# Patient Record
Sex: FEMALE | Race: WHITE | NOT HISPANIC OR LATINO
[De-identification: names, ages, dates, MRNs, and addresses within clinical notes are randomized per-mention and may not be internally consistent; named-entity substitution may affect disease eponyms.]

---

## 2017-03-03 ENCOUNTER — APPOINTMENT (OUTPATIENT)
Dept: OTOLARYNGOLOGY | Facility: CLINIC | Age: 52
End: 2017-03-03

## 2017-03-03 VITALS
HEART RATE: 73 BPM | SYSTOLIC BLOOD PRESSURE: 121 MMHG | DIASTOLIC BLOOD PRESSURE: 78 MMHG | WEIGHT: 140 LBS | HEIGHT: 62 IN | BODY MASS INDEX: 25.76 KG/M2

## 2017-03-03 DIAGNOSIS — Z82.49 FAMILY HISTORY OF ISCHEMIC HEART DISEASE AND OTHER DISEASES OF THE CIRCULATORY SYSTEM: ICD-10-CM

## 2017-03-03 DIAGNOSIS — Z83.3 FAMILY HISTORY OF DIABETES MELLITUS: ICD-10-CM

## 2017-03-04 ENCOUNTER — TRANSCRIPTION ENCOUNTER (OUTPATIENT)
Age: 52
End: 2017-03-04

## 2017-03-15 ENCOUNTER — APPOINTMENT (OUTPATIENT)
Dept: OTOLARYNGOLOGY | Facility: CLINIC | Age: 52
End: 2017-03-15

## 2017-04-07 ENCOUNTER — OTHER (OUTPATIENT)
Age: 52
End: 2017-04-07

## 2017-04-14 ENCOUNTER — APPOINTMENT (OUTPATIENT)
Dept: OTOLARYNGOLOGY | Facility: CLINIC | Age: 52
End: 2017-04-14

## 2017-04-26 ENCOUNTER — TRANSCRIPTION ENCOUNTER (OUTPATIENT)
Age: 52
End: 2017-04-26

## 2017-05-06 ENCOUNTER — TRANSCRIPTION ENCOUNTER (OUTPATIENT)
Age: 52
End: 2017-05-06

## 2019-04-19 ENCOUNTER — APPOINTMENT (OUTPATIENT)
Dept: OTOLARYNGOLOGY | Facility: CLINIC | Age: 54
End: 2019-04-19

## 2020-04-29 ENCOUNTER — TRANSCRIPTION ENCOUNTER (OUTPATIENT)
Age: 55
End: 2020-04-29

## 2020-12-14 ENCOUNTER — APPOINTMENT (OUTPATIENT)
Dept: OTOLARYNGOLOGY | Facility: CLINIC | Age: 55
End: 2020-12-14
Payer: SELF-PAY

## 2020-12-14 VITALS
BODY MASS INDEX: 32.2 KG/M2 | HEIGHT: 62 IN | HEART RATE: 84 BPM | TEMPERATURE: 97.3 F | WEIGHT: 175 LBS | DIASTOLIC BLOOD PRESSURE: 97 MMHG | SYSTOLIC BLOOD PRESSURE: 140 MMHG

## 2020-12-14 PROCEDURE — 99204 OFFICE O/P NEW MOD 45 MIN: CPT | Mod: 25

## 2020-12-14 PROCEDURE — 31231 NASAL ENDOSCOPY DX: CPT

## 2020-12-14 NOTE — PHYSICAL EXAM
[de-identified] : very poor tip support  [Midline] : trachea located in midline position [Normal] : no rashes

## 2020-12-14 NOTE — PROCEDURE
[FreeTextEntry6] : Procedure performed: Nasal Endoscopy- Diagnostic\par Pre-op indication(s): nasal congestion\par Post-op indication(s): nasal congestion \par Verbal and/or written consent obtained from patient\par Anterior rhinoscopy insufficient to account for symptoms\par Scope #: 3,  flexible fiber optic telescope \par The scope was introduced in the nasal passage between the middle and inferior turbinates to exam the inferior portion of the middle meatus and the fontanelle, as well as the maxillary ostia.  Next, the scope was passed medically and posteriorly to the middle turbinates to examine the sphenoethmoid recess and the superior turbinate region.\par Upon visualization the finders are as follows:\par Nasal Septum: dry scarred anterior lining, slit like septal perf clean, right septal deviation anteriorly + narrow valve \par Bilateral - Mucosa: boggy turbinates, Mucous: scant, Polyp: not seen, Inferior Turbinate: boggy, Middle Turbinate: normal, Superior Turbinate: normal, Inferior Meatus: narrow, Middle Meatus: post surgical, sinuses open. Super Meatus:normal, Sphenoethmoidal Recess: clear\par

## 2020-12-14 NOTE — HISTORY OF PRESENT ILLNESS
[de-identified] : right sided SMG mostly gets swelling intermittently \par 3 years\par also left parotid - swells up less\par get swelling in the right parotid and left SMg as well \par uses hot pack - does help \par feels like throat is closing \par no dry eyes or dry mouth \par no history chemo or rt \par \par also with sinus issues with history of sinus surgery and a septal perf after \par gets nasal congestion worse in the right \par

## 2020-12-14 NOTE — ASSESSMENT
[FreeTextEntry1] : Nasal congestion with mild septal deviation, perf and scarred lining with valve collapse and poor tip support. airway still open, lots of scarring and perf, would avoid further procedure \par - Will start q-nasal, has helped in the past. A topical steroid reduce mucosal swelling, illustrated appropriate use and how to reduce the risk of bleeding \par - Nasal irrigation and showed how to use it to maximize effectiveness \par - ponaris oil\par continue allergy shots \par \par \par pt with right SMG stone - 2mm, likely can get via sialoendsocopy, looks to be in the proxiumal duct on scan, left parotid stone in the tail of the parotid, unlikley to be in the duct - will likely not be able to remove, and is likely not the cause of the issue\par it seems all her glands are swelling, but not texbook for obstruction, she would like to tryto clean them out and diilate to see if will help. disucssed risk of persist sx, and odd of her cure are lower. she understood \par Discussed options for recurrent submandibular sialoadenitis- observation with conservative management versus interventional sialoendoscopy vs excision of gland. \par Discussed details of the regiment/procedures and risks of each including but not limited to:\par interventional sialoendoscopy - bleeding, infection, scarring, inability to remove stone, ductal perforation/avulsion, injury to surrounding nerves, seroma, persistent sx \par Combined approach - increased risk of bleeding and infections injury to surrounding nerves including lingual, hypoglossal and sensory nerves as well as megaduct with large stone \par complete gland removal - external scar, injury to marginal mandibular nerve etc. \par Would like to do sialoendoscopy. \par Discussed options for recurrent parotid sialoadenitis- observation with conservative management versus interventional sialoendoscopy vs excision of gland. \par Discussed details of the regiment/procedures and risks of each including but not limited to:\par interventional sialoendoscopy - bleeding, infection, scarring, inability to remove stone, ductal perforation/avulsion, injury to surrounding nerves, seroma, persistent or worsening of sx.\par Combined approach - increased risk of bleeding and infections injury to surrounding nerves including facial nerved and sensory nerves. \par complete gland removal - external scar, injury to facial nerve etc. \par Would like to do sialoendoscopy.  Will  try to switch insurances and then will call to book surgery\par will pan for 4 gland washout\par no dry eyes of mouth\par

## 2022-02-07 ENCOUNTER — APPOINTMENT (OUTPATIENT)
Dept: OTOLARYNGOLOGY | Facility: CLINIC | Age: 57
End: 2022-02-07
Payer: COMMERCIAL

## 2022-02-07 VITALS — WEIGHT: 175 LBS | HEIGHT: 62 IN | BODY MASS INDEX: 32.2 KG/M2 | TEMPERATURE: 97.6 F

## 2022-02-07 PROCEDURE — 99214 OFFICE O/P EST MOD 30 MIN: CPT | Mod: 25

## 2022-02-07 PROCEDURE — 31231 NASAL ENDOSCOPY DX: CPT

## 2022-02-07 NOTE — PHYSICAL EXAM
[Midline] : trachea located in midline position [Normal] : no rashes [de-identified] : + decreased flow from all 4 glands  [de-identified] : very poor tip support

## 2022-02-07 NOTE — PROCEDURE
[FreeTextEntry6] : Procedure performed: Nasal Endoscopy- Diagnostic\par Pre-op indication(s): nasal congestion\par Post-op indication(s): nasal congestion \par Verbal and/or written consent obtained from patient\par Anterior rhinoscopy insufficient to account for symptoms\par Scope #: 3,  flexible fiber optic telescope \par The scope was introduced in the nasal passage between the middle and inferior turbinates to exam the inferior portion of the middle meatus and the fontanelle, as well as the maxillary ostia.  Next, the scope was passed medically and posteriorly to the middle turbinates to examine the sphenoethmoid recess and the superior turbinate region.\par Upon visualization the finders are as follows:\par Nasal Septum: dry scarred anterior lining, slit like septal perf clean, right septal deviation anteriorly + narrow valve, +multiple septal perf- clean \par Bilateral - Mucosa: boggy turbinates, Mucous: scant, Polyp: not seen, Inferior Turbinate: boggy, Middle Turbinate: BITH, Superior Turbinate: normal, Inferior Meatus: narrow, Middle Meatus: post surgical, sinuses open. Super Meatus:normal, Sphenoethmoidal Recess: clear\par

## 2022-02-07 NOTE — ASSESSMENT
[FreeTextEntry1] : Nasal congestion with mild septal deviation, perf and scarred lining with valve collapse and poor tip support. airway still open, lots of scarring and perf, feels last surgery made her worse, looks open outsid eof possibly frontal, multiple septal perfs superiorly will see if can avoid further procedure \par - q-nasal, has helped in the past. A topical steroid reduce mucosal swelling, illustrated appropriate use and how to reduce the risk of bleeding \par - Nasal irrigation and showed how to use it to maximize effectiveness \par - ponaris oil\par continue allergy shots \par \par \par pt with right SMG sialoadenitis likely can get via sialoendsocopy, looks to be in the proximal duct on scan, left parotid stone in the tail of the parotid, unlikley to be in the duct - will likely not be able to remove, and is likely not the cause of the issue\par it seems all her glands are swelling, but not texbook for obstruction, she would like to try to clean them out and dilate to see if will help. discussed risk of persist sx, and odd of her cure are lower. she understood \par ordered CT scan to access salivary glands \par Discussed options for recurrent submandibular sialoadenitis- observation with conservative management versus interventional sialoendoscopy vs excision of gland. \par Discussed details of the regiment/procedures and risks of each including but not limited to:\par interventional sialoendoscopy - bleeding, infection, scarring, inability to remove stone, ductal perforation/avulsion, injury to surrounding nerves, seroma, persistent sx \par Combined approach - increased risk of bleeding and infections injury to surrounding nerves including lingual, hypoglossal and sensory nerves as well as megaduct with large stone \par complete gland removal - external scar, injury to marginal mandibular nerve etc. \par Would like to do sialoendoscopy. \par Discussed options for recurrent parotid sialoadenitis- observation with conservative management versus interventional sialoendoscopy vs excision of gland. \par Discussed details of the regiment/procedures and risks of each including but not limited to:\par interventional sialoendoscopy - bleeding, infection, scarring, inability to remove stone, ductal perforation/avulsion, injury to surrounding nerves, seroma, persistent or worsening of sx.\par Combined approach - increased risk of bleeding and infections injury to surrounding nerves including facial nerved and sensory nerves. \par complete gland removal - external scar, injury to facial nerve etc. \par will plan for 4 gland washout \par no dry eyes of mouth\par

## 2022-02-07 NOTE — CONSULT LETTER
[Please see my note below.] : Please see my note below. [FreeTextEntry1] : Dear Dr. IFTIKHAR HSU \par I had the pleasure of evaluating your patient AVERY KIM, thank you for allowing us to participate in their care. please see full note detailing our visit below.\par If you have any questions, please do not hesitate to call me and I would be happy to discuss further. \par \par Jim Murphy M.D.\par Attending Physician,  \par Department of Otolaryngology - Head and Neck Surgery\[facility] \par Office: [phone]\par Fax: [phone]\par \par

## 2022-02-07 NOTE — HISTORY OF PRESENT ILLNESS
[de-identified] : right sided SMG mostly gets swelling intermittently \par 3 years\par also left parotid - swells up less\par get swelling in the right parotid and left SMg as well \par uses hot pack - does help \par feels like throat is closing \par no dry eyes or dry mouth \par no history chemo or rt \par \par also with sinus issues with history of sinus surgery and a septal perf after \par gets nasal congestion worse in the right \par  [FreeTextEntry1] : Pt with recurrent intermittent R SMG swelling, and pain. States has been going on for many years, feels like its getting worse. Pt also admits to having R SMG pain and swelling everyday. \par Uses warm compresses with relief. \par Was planning on surgery last time she was here however couldn't’t go forth with it due to insurance. \par NO imaging done recently. \par \par Pt also with sinus pressure- maxillary and frontal pressure. Pt states will radiate to the right side jaw. \par Pt states every time her sinuses bother her the SMG swelling will start. Pt admits to about 4-5 sinus infections a year, will get abx for it. \par pt uses q nasal washes with mild relief, feels sinus wash doesn’t work. \par Pt was getting allergy shots, has allergies.

## 2022-02-07 NOTE — END OF VISIT
[FreeTextEntry3] : I personally saw and examined AVERY KIM in detail. I spoke to SERAFIN Vences regarding the assessment and plan of care.  I preformed the procedures and I reviewed the above assessment and plan of care, and agree. I have made changes in changes in the body of the note where appropriate.\par \par

## 2022-02-24 ENCOUNTER — OUTPATIENT (OUTPATIENT)
Dept: OUTPATIENT SERVICES | Facility: HOSPITAL | Age: 57
LOS: 1 days | End: 2022-02-24
Payer: COMMERCIAL

## 2022-02-24 ENCOUNTER — APPOINTMENT (OUTPATIENT)
Dept: CT IMAGING | Facility: CLINIC | Age: 57
End: 2022-02-24
Payer: COMMERCIAL

## 2022-02-24 DIAGNOSIS — Z98.890 OTHER SPECIFIED POSTPROCEDURAL STATES: Chronic | ICD-10-CM

## 2022-02-24 DIAGNOSIS — Z98.891 HISTORY OF UTERINE SCAR FROM PREVIOUS SURGERY: Chronic | ICD-10-CM

## 2022-02-24 DIAGNOSIS — Z90.89 ACQUIRED ABSENCE OF OTHER ORGANS: Chronic | ICD-10-CM

## 2022-02-24 DIAGNOSIS — K11.5 SIALOLITHIASIS: ICD-10-CM

## 2022-02-24 DIAGNOSIS — Z90.49 ACQUIRED ABSENCE OF OTHER SPECIFIED PARTS OF DIGESTIVE TRACT: Chronic | ICD-10-CM

## 2022-02-24 PROCEDURE — 70491 CT SOFT TISSUE NECK W/DYE: CPT

## 2022-02-24 PROCEDURE — 70491 CT SOFT TISSUE NECK W/DYE: CPT | Mod: 26

## 2022-02-28 ENCOUNTER — NON-APPOINTMENT (OUTPATIENT)
Age: 57
End: 2022-02-28

## 2022-03-15 ENCOUNTER — APPOINTMENT (OUTPATIENT)
Dept: OTOLARYNGOLOGY | Facility: CLINIC | Age: 57
End: 2022-03-15

## 2022-03-16 ENCOUNTER — APPOINTMENT (OUTPATIENT)
Dept: NEUROSURGERY | Facility: CLINIC | Age: 57
End: 2022-03-16
Payer: COMMERCIAL

## 2022-03-16 VITALS
HEIGHT: 62 IN | SYSTOLIC BLOOD PRESSURE: 112 MMHG | HEART RATE: 68 BPM | TEMPERATURE: 97.2 F | WEIGHT: 175 LBS | OXYGEN SATURATION: 94 % | BODY MASS INDEX: 32.2 KG/M2 | DIASTOLIC BLOOD PRESSURE: 76 MMHG

## 2022-03-16 DIAGNOSIS — I67.1 CEREBRAL ANEURYSM, NONRUPTURED: ICD-10-CM

## 2022-03-16 PROCEDURE — 99202 OFFICE O/P NEW SF 15 MIN: CPT

## 2022-03-16 NOTE — HISTORY OF PRESENT ILLNESS
[de-identified] : Sara Hayden is a 56 year old female here for neurosurgical evaluation of incidental aneurysm found on imaging for chronic sialoadenitis following up with ENT. She has no significant PMH. On CT neck soft tissue with IV contrast on 2/24/22, revealed incidental 4mm medially directed outpouching arising from left cavernous sinus ICA suggestive of aneurysm. Denies symptoms of motor, sensory, speech, or visual abnormalities. \par

## 2022-03-16 NOTE — ASSESSMENT
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 \par Due to the small size and location of the aneurysm being in the cavernous segment of the carotid artery, it has very low risk of rupturing. It is not intracranial and does not cause subarachnoid hemorrhage should it rupture. Should it rupture it can cause a cavernous carotid fistula but this is a very low likelihood. Recommended conservative management with serial scans.\par \par \par Plan:\par MRA brain non con in 1 year after last imaging (February 2023)\par Follow up phone call to discuss results

## 2022-03-16 NOTE — PHYSICAL EXAM
[Person] : oriented to person [Place] : oriented to place [Time] : oriented to time [Cranial Nerves Optic (II)] : visual acuity intact bilaterally,  pupils equal round and reactive to light [Cranial Nerves Oculomotor (III)] : extraocular motion intact [Cranial Nerves Trigeminal (V)] : facial sensation intact symmetrically [Cranial Nerves Facial (VII)] : face symmetrical [Cranial Nerves Vestibulocochlear (VIII)] : hearing was intact bilaterally [Cranial Nerves Glossopharyngeal (IX)] : tongue and palate midline [Cranial Nerves Accessory (XI - Cranial And Spinal)] : head turning and shoulder shrug symmetric [Cranial Nerves Hypoglossal (XII)] : there was no tongue deviation with protrusion [Motor Tone] : muscle tone was normal in all four extremities [Motor Strength] : muscle strength was normal in all four extremities [Abnormal Walk] : normal gait [Balance] : balance was intact

## 2022-04-14 ENCOUNTER — OUTPATIENT (OUTPATIENT)
Dept: OUTPATIENT SERVICES | Facility: HOSPITAL | Age: 57
LOS: 1 days | End: 2022-04-14
Payer: COMMERCIAL

## 2022-04-14 VITALS
TEMPERATURE: 98 F | HEIGHT: 62 IN | SYSTOLIC BLOOD PRESSURE: 128 MMHG | OXYGEN SATURATION: 97 % | DIASTOLIC BLOOD PRESSURE: 88 MMHG | HEART RATE: 67 BPM | RESPIRATION RATE: 18 BRPM | WEIGHT: 182.98 LBS

## 2022-04-14 DIAGNOSIS — Z90.89 ACQUIRED ABSENCE OF OTHER ORGANS: Chronic | ICD-10-CM

## 2022-04-14 DIAGNOSIS — Z98.890 OTHER SPECIFIED POSTPROCEDURAL STATES: Chronic | ICD-10-CM

## 2022-04-14 DIAGNOSIS — Z90.49 ACQUIRED ABSENCE OF OTHER SPECIFIED PARTS OF DIGESTIVE TRACT: Chronic | ICD-10-CM

## 2022-04-14 DIAGNOSIS — Z01.818 ENCOUNTER FOR OTHER PREPROCEDURAL EXAMINATION: ICD-10-CM

## 2022-04-14 DIAGNOSIS — Z98.891 HISTORY OF UTERINE SCAR FROM PREVIOUS SURGERY: Chronic | ICD-10-CM

## 2022-04-14 DIAGNOSIS — K11.5 SIALOLITHIASIS: ICD-10-CM

## 2022-04-14 LAB
ANION GAP SERPL CALC-SCNC: 11 MMOL/L — SIGNIFICANT CHANGE UP (ref 5–17)
BUN SERPL-MCNC: 13 MG/DL — SIGNIFICANT CHANGE UP (ref 7–23)
CALCIUM SERPL-MCNC: 9.6 MG/DL — SIGNIFICANT CHANGE UP (ref 8.4–10.5)
CHLORIDE SERPL-SCNC: 104 MMOL/L — SIGNIFICANT CHANGE UP (ref 96–108)
CO2 SERPL-SCNC: 23 MMOL/L — SIGNIFICANT CHANGE UP (ref 22–31)
CREAT SERPL-MCNC: 0.71 MG/DL — SIGNIFICANT CHANGE UP (ref 0.5–1.3)
EGFR: 98 ML/MIN/1.73M2 — SIGNIFICANT CHANGE UP
GLUCOSE SERPL-MCNC: 120 MG/DL — HIGH (ref 70–99)
HCT VFR BLD CALC: 45.9 % — HIGH (ref 34.5–45)
HGB BLD-MCNC: 15 G/DL — SIGNIFICANT CHANGE UP (ref 11.5–15.5)
MCHC RBC-ENTMCNC: 29.1 PG — SIGNIFICANT CHANGE UP (ref 27–34)
MCHC RBC-ENTMCNC: 32.7 GM/DL — SIGNIFICANT CHANGE UP (ref 32–36)
MCV RBC AUTO: 89.1 FL — SIGNIFICANT CHANGE UP (ref 80–100)
NRBC # BLD: 0 /100 WBCS — SIGNIFICANT CHANGE UP (ref 0–0)
PLATELET # BLD AUTO: 333 K/UL — SIGNIFICANT CHANGE UP (ref 150–400)
POTASSIUM SERPL-MCNC: 4.1 MMOL/L — SIGNIFICANT CHANGE UP (ref 3.5–5.3)
POTASSIUM SERPL-SCNC: 4.1 MMOL/L — SIGNIFICANT CHANGE UP (ref 3.5–5.3)
RBC # BLD: 5.15 M/UL — SIGNIFICANT CHANGE UP (ref 3.8–5.2)
RBC # FLD: 13 % — SIGNIFICANT CHANGE UP (ref 10.3–14.5)
SODIUM SERPL-SCNC: 138 MMOL/L — SIGNIFICANT CHANGE UP (ref 135–145)
WBC # BLD: 9.17 K/UL — SIGNIFICANT CHANGE UP (ref 3.8–10.5)
WBC # FLD AUTO: 9.17 K/UL — SIGNIFICANT CHANGE UP (ref 3.8–10.5)

## 2022-04-14 PROCEDURE — 85027 COMPLETE CBC AUTOMATED: CPT

## 2022-04-14 PROCEDURE — 80048 BASIC METABOLIC PNL TOTAL CA: CPT

## 2022-04-14 PROCEDURE — G0463: CPT

## 2022-04-14 RX ORDER — SODIUM CHLORIDE 9 MG/ML
3 INJECTION INTRAMUSCULAR; INTRAVENOUS; SUBCUTANEOUS EVERY 8 HOURS
Refills: 0 | Status: DISCONTINUED | OUTPATIENT
Start: 2022-04-20 | End: 2022-05-04

## 2022-04-14 NOTE — H&P PST ADULT - FALL HARM RISK - UNIVERSAL INTERVENTIONS
Bed in lowest position, wheels locked, appropriate side rails in place/Call bell, personal items and telephone in reach/Instruct patient to call for assistance before getting out of bed or chair/Non-slip footwear when patient is out of bed/Bee to call system/Physically safe environment - no spills, clutter or unnecessary equipment/Purposeful Proactive Rounding/Room/bathroom lighting operational, light cord in reach

## 2022-04-14 NOTE — H&P PST ADULT - NSICDXPASTSURGICALHX_GEN_ALL_CORE_FT
PAST SURGICAL HISTORY:  H/O sinus surgery 2015 & 2016    History of appendectomy 1982    History of  section x4    History of cholecystectomy 2006    History of tonsillectomy 1984

## 2022-04-14 NOTE — H&P PST ADULT - HISTORY OF PRESENT ILLNESS
56 year old female with hx of frequent sinus infections, s/p sinus surgery in 2015 & 2016 (still with complaints of frequent sinus congestion) and presents today for presurgical evaluation for Sialoendoscopy, Sialodochoplasty w/ Stone Removal of all 4 glands and B/L Submandibular & B/L Parotid Glands on 4/20/22. Pt w/ incidental finding of a 4mm left cavernous sinus ICA aneurysm (seen by Dr Cuenca and due to size and location it is of very low risk of rupturing- pt to follow up in 1yr).  Denies hx of COVID or recent fevers, chills, cough, chest pain or SOB and feels well otherwise. COVID testing scheduled at [facility] on 4/17/22.

## 2022-04-14 NOTE — H&P PST ADULT - NSICDXPASTMEDICALHX_GEN_ALL_CORE_FT
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

## 2022-04-14 NOTE — H&P PST ADULT - NEGATIVE NEUROLOGICAL SYMPTOMS
no weakness/no paresthesias/no generalized seizures/no focal seizures/no syncope/no loss of sensation/no headache

## 2022-04-14 NOTE — H&P PST ADULT - NSICDXFAMILYHX_GEN_ALL_CORE_FT
FAMILY HISTORY:  Father  Still living? No  Family history of myocardial infarction, Age at diagnosis: Age Unknown    Mother  Still living? Unknown  Diabetes mellitus, Age at diagnosis: Age Unknown  Hypertension, Age at diagnosis: Age Unknown

## 2022-04-14 NOTE — H&P PST ADULT - PROBLEM SELECTOR PLAN 1
Scheduled for sx on 4/20/22. Surgical instructions reviewed w/ pt. COVID testing scheduled at [facility] on 4/17/22.

## 2022-04-16 PROBLEM — K11.5 SIALOLITHIASIS: Chronic | Status: ACTIVE | Noted: 2022-04-14

## 2022-04-16 PROBLEM — I67.1 CEREBRAL ANEURYSM, NONRUPTURED: Chronic | Status: ACTIVE | Noted: 2022-04-14

## 2022-04-17 ENCOUNTER — OUTPATIENT (OUTPATIENT)
Dept: OUTPATIENT SERVICES | Facility: HOSPITAL | Age: 57
LOS: 1 days | End: 2022-04-17
Payer: COMMERCIAL

## 2022-04-17 DIAGNOSIS — Z90.49 ACQUIRED ABSENCE OF OTHER SPECIFIED PARTS OF DIGESTIVE TRACT: Chronic | ICD-10-CM

## 2022-04-17 DIAGNOSIS — Z98.890 OTHER SPECIFIED POSTPROCEDURAL STATES: Chronic | ICD-10-CM

## 2022-04-17 DIAGNOSIS — Z90.89 ACQUIRED ABSENCE OF OTHER ORGANS: Chronic | ICD-10-CM

## 2022-04-17 DIAGNOSIS — Z11.52 ENCOUNTER FOR SCREENING FOR COVID-19: ICD-10-CM

## 2022-04-17 DIAGNOSIS — Z98.891 HISTORY OF UTERINE SCAR FROM PREVIOUS SURGERY: Chronic | ICD-10-CM

## 2022-04-17 LAB — SARS-COV-2 RNA SPEC QL NAA+PROBE: SIGNIFICANT CHANGE UP

## 2022-04-17 PROCEDURE — C9803: CPT

## 2022-04-17 PROCEDURE — U0005: CPT

## 2022-04-17 PROCEDURE — U0003: CPT

## 2022-04-19 ENCOUNTER — TRANSCRIPTION ENCOUNTER (OUTPATIENT)
Age: 57
End: 2022-04-19

## 2022-04-20 ENCOUNTER — APPOINTMENT (OUTPATIENT)
Dept: OTOLARYNGOLOGY | Facility: HOSPITAL | Age: 57
End: 2022-04-20

## 2022-04-20 ENCOUNTER — TRANSCRIPTION ENCOUNTER (OUTPATIENT)
Age: 57
End: 2022-04-20

## 2022-04-20 ENCOUNTER — OUTPATIENT (OUTPATIENT)
Dept: OUTPATIENT SERVICES | Facility: HOSPITAL | Age: 57
LOS: 1 days | End: 2022-04-20
Payer: COMMERCIAL

## 2022-04-20 VITALS
TEMPERATURE: 98 F | RESPIRATION RATE: 14 BRPM | HEART RATE: 65 BPM | DIASTOLIC BLOOD PRESSURE: 56 MMHG | OXYGEN SATURATION: 99 % | SYSTOLIC BLOOD PRESSURE: 97 MMHG

## 2022-04-20 VITALS
TEMPERATURE: 98 F | DIASTOLIC BLOOD PRESSURE: 81 MMHG | OXYGEN SATURATION: 98 % | WEIGHT: 182.98 LBS | RESPIRATION RATE: 15 BRPM | HEIGHT: 62 IN | SYSTOLIC BLOOD PRESSURE: 119 MMHG | HEART RATE: 71 BPM

## 2022-04-20 DIAGNOSIS — Z98.890 OTHER SPECIFIED POSTPROCEDURAL STATES: Chronic | ICD-10-CM

## 2022-04-20 DIAGNOSIS — Z90.49 ACQUIRED ABSENCE OF OTHER SPECIFIED PARTS OF DIGESTIVE TRACT: Chronic | ICD-10-CM

## 2022-04-20 DIAGNOSIS — Z90.89 ACQUIRED ABSENCE OF OTHER ORGANS: Chronic | ICD-10-CM

## 2022-04-20 DIAGNOSIS — Z98.891 HISTORY OF UTERINE SCAR FROM PREVIOUS SURGERY: Chronic | ICD-10-CM

## 2022-04-20 DIAGNOSIS — K11.5 SIALOLITHIASIS: ICD-10-CM

## 2022-04-20 PROCEDURE — C9399: CPT

## 2022-04-20 PROCEDURE — C1769: CPT

## 2022-04-20 PROCEDURE — 42500 REPAIR SALIVARY DUCT: CPT

## 2022-04-20 PROCEDURE — 42699B: CUSTOM

## 2022-04-20 PROCEDURE — 42505 REPAIR SALIVARY DUCT: CPT

## 2022-04-20 PROCEDURE — C1889: CPT

## 2022-04-20 PROCEDURE — 42699 UNLISTED PX SALIVRY GLND/DUX: CPT

## 2022-04-20 DEVICE — IMPLANTABLE DEVICE: Type: IMPLANTABLE DEVICE | Status: FUNCTIONAL

## 2022-04-20 DEVICE — STENT WALVEKAR SALIVARY 1MM W/ GUIDEWIRE: Type: IMPLANTABLE DEVICE | Status: FUNCTIONAL

## 2022-04-20 RX ORDER — HYDROMORPHONE HYDROCHLORIDE 2 MG/ML
0.25 INJECTION INTRAMUSCULAR; INTRAVENOUS; SUBCUTANEOUS
Refills: 0 | Status: DISCONTINUED | OUTPATIENT
Start: 2022-04-20 | End: 2022-04-20

## 2022-04-20 RX ORDER — LIDOCAINE HCL 20 MG/ML
0.2 VIAL (ML) INJECTION ONCE
Refills: 0 | Status: COMPLETED | OUTPATIENT
Start: 2022-04-20 | End: 2022-04-20

## 2022-04-20 RX ORDER — SODIUM CHLORIDE 9 MG/ML
1000 INJECTION, SOLUTION INTRAVENOUS ONCE
Refills: 0 | Status: COMPLETED | OUTPATIENT
Start: 2022-04-20 | End: 2022-04-20

## 2022-04-20 RX ORDER — SODIUM CHLORIDE 9 MG/ML
1000 INJECTION, SOLUTION INTRAVENOUS
Refills: 0 | Status: DISCONTINUED | OUTPATIENT
Start: 2022-04-20 | End: 2022-05-04

## 2022-04-20 RX ORDER — ONDANSETRON 8 MG/1
4 TABLET, FILM COATED ORAL ONCE
Refills: 0 | Status: DISCONTINUED | OUTPATIENT
Start: 2022-04-20 | End: 2022-05-04

## 2022-04-20 RX ORDER — OXYCODONE HYDROCHLORIDE 5 MG/1
5 TABLET ORAL ONCE
Refills: 0 | Status: DISCONTINUED | OUTPATIENT
Start: 2022-04-20 | End: 2022-04-20

## 2022-04-20 RX ADMIN — SODIUM CHLORIDE 3 MILLILITER(S): 9 INJECTION INTRAMUSCULAR; INTRAVENOUS; SUBCUTANEOUS at 07:31

## 2022-04-20 RX ADMIN — SODIUM CHLORIDE 1000 MILLILITER(S): 9 INJECTION, SOLUTION INTRAVENOUS at 07:34

## 2022-04-20 NOTE — ASU DISCHARGE PLAN (ADULT/PEDIATRIC) - NURSING INSTRUCTIONS
*********************************************************************************************  You may take TYLENOL (acetaminophen) after _______3:35______ PM if needed for pain. DO NOT take any additional products containing TYLENOL or ACETAMINOPHEN, such as VICODIN, PERCOCET, NORCO, EXCEDRIN, and any over-the-counter cold medications until this time. DO NOT CONSUME MORE THAN 9545-2078 MG of TYLENOL (acetaminophen) in a 24-hour period.   ******************************************************************************************

## 2022-04-20 NOTE — ASU DISCHARGE PLAN (ADULT/PEDIATRIC) - ASU DC SPECIAL INSTRUCTIONSFT
keep saliva flowing to keep duct open and reduce risk of narrowing:  Massage gland every hour   ice for first 24 -48 hours, then can use warm compress  hydrate well  soft foods and rinse out mouth after eating for 1 week   pain medication - Tylenol or Motrin as needed  expect swelling and discomfort  follow up 2 week in the office keep saliva flowing to keep duct open and reduce risk of narrowing:  Massage gland every hour   ice for first 24 -48 hours, then can use warm compress  hydrate well  pain medication - Tylenol or Motrin as needed  expect swelling and discomfort  IF with stent, and it falls out- it's normal   follow up 1 week in the office  ALL ABX, AND PAIN MEDS SENT TO PHARMACY [facility]IA [email or web address] Keep saliva flowing to keep duct open and reduce risk of narrowing.    Massage gland every hour.    Ice for first 24 -48 hours, then can use warm compress.    Hydrate well.    Pain medication - Tylenol or Motrin as needed.    Expect swelling and discomfort.    IF with stent, and it falls out - it's normal.    Follow up 1 week in the office.    ALL ABX, AND PAIN MEDS SENT TO PHARMACY VIA [facility]

## 2022-04-20 NOTE — ASU DISCHARGE PLAN (ADULT/PEDIATRIC) - NPI NUMBER (FOR SYS[employer] USE ONLY) :
After Visit Summary   7/26/2017    Tamar Jhaveri    MRN: [id number]           Patient Information     Date Of Birth          1944        Visit Information        Provider Department      7/26/2017 9:20 AM Glynn Jarquin MD [facility]        Today's Diagnoses     Lung replaced by transplant (H)    -  1    Lung transplant rejection (H)          Care Instructions    Patient Instructions  1. We will submit order for hospital bed  2. Add Boost or Ensure - one to 2 daily supplements  3. Increase prednisone dosing to 30mg daily for one week and then return doing to your baseline dosing  4. Will schedule appt with palliative for overall decrease in health  5 Will start Azithromycin 250mg, every Monday, Wednesday and Friday  7. EKG today    Next transplant clinic appointment:  One week with CXR, labs and PFTs  Next lab draw: one week        ~~~~~~~~~~~~~~~~~~~~~~~~~    Thoracic Transplant Office phone [phone], fax [phone]  Office Hours 8:30 - 5:00     For after-hours urgent issues, please dial [phone], and ask to speak with the Thoracic Transplant Coordinator  On-Call, pager [phone].  --------------------  To expedite your medication refill(s), please contact your pharmacy and have them fax a refill request to: [phone].   *Please allow 3 business days for routine medication refills.  *Please allow 5 business days for controlled substance medication refills.    **For Diabetic medications and supplies refill(s), please contact your pharmacy and have them  Contact your Endocrine team.  --------------------  For scheduling appointments call [facility] :  [phone]. For lab appointments call [phone] or [facility].  --------------------  Please Note: If you are active on [email or web address], all future test results will be sent by [email or web address] message only, and will no longer be called to patient. You may also receive communication directly from  your physician.          Follow-ups after your visit        Additional Services     Palliative Care Referral       Post lung transplant in 2008 and increasing drop in pulmonary function and overall poor health.                  Your next 10 appointments already scheduled     Aug 02, 2017  9:30 AM CDT   Lab with  LAB   [facility] ([facility])    [address] [place] [id number]   [phone]            Aug 02, 2017  9:45 AM CDT   (Arrive by 9:30 AM)   XR CHEST 2 VIEWS with UCXR1   [facility] ([facility])    [address] [place] [id number] [phone]           Please bring a list of your current medicines to your exam. (Include vitamins, minerals and over-thecounter medicines.) Leave your valuables at home.  Tell your doctor if there is a chance you may be pregnant.  You do not need to do anything special for this exam.            Aug 02, 2017 10:30 AM CDT   PFT VISIT with  PFL B   [facility] Pulmonary Function Testing ([facility])    [address] [place] [id number]   [phone]            Aug 02, 2017 10:40 AM CDT   (Arrive by 10:25 AM)   Return Lung Transplant with Glynn Jarquin MD   [facility] ([facility])    [address] [place] [phone]   [phone]            Dec 21, 2017  3:00 PM CST   [facility] Draw with  [facility] DRAW   [facility] Draw ([facility])    [address] [place] [id number]   [phone]            Dec 21, 2017  3:30 PM CST   RETURN ONC with Jet Lema MD   [facility] Blood and Marrow Transplant ([facility])    [address] [place] [phone]   [phone]              Future tests that were  ordered for you today     Open Future Orders        Priority Expected Expires Ordered    Basic metabolic panel Routine 7/30/2017 8/12/2017 7/26/2017    Magnesium Routine 7/30/2017 8/12/2017 7/26/2017    CBC with platelets Routine 7/30/2017 8/12/2017 7/26/2017    CMV DNA quantification Routine 7/30/2017 8/12/2017 7/26/2017    X-ray Chest 2 vws* Routine 7/30/2017 8/12/2017 7/26/2017    Spirometry, Breathing Capacity Routine 7/30/2017 8/12/2017 7/26/2017    Tacrolimus level Routine 7/30/2017 8/12/2017 7/26/2017    Palliative Care Referral Routine 7/26/2017 10/26/2017 7/26/2017    EKG 12-lead complete w/read - Clinics Routine 7/26/2017 7/27/2017 7/26/2017            Who to contact     If you have questions or need follow up information about today's clinic visit or your schedule please contact [facility] directly at [phone].  Normal or non-critical lab and imaging results will be communicated to you by [employer], letter or phone within 4 business days after the clinic has received the results. If you do not hear from us within 7 days, please contact the clinic through [employer] or phone. If you have a critical or abnormal lab result, we will notify you by phone as soon as possible.  Submit refill requests through [employer] or call your pharmacy and they will forward the refill request to us. Please allow 3 business days for your refill to be completed.          Additional Information About Your Visit        [employer] Information     [employer] gives you secure access to your electronic health record. If you see a primary care provider, you can also send messages to your care team and make appointments. If you have questions, please call your primary care clinic.  If you do not have a primary care provider, please call [phone] and they will assist you.        Care EveryWhere ID     This is your Care EveryWhere ID. This could be used by other organizations to access your [facility]  "records  [id number]        Your Vitals Were     Pulse Respirations Height Pulse Oximetry BMI (Body Mass Index)       78 18 1.626 m (5' 4\") 94% 17.37 kg/m2        Blood Pressure from Last 3 Encounters:   07/26/17 139/83   06/15/17 139/71   05/30/17 138/84    Weight from Last 3 Encounters:   07/26/17 45.9 kg (101 lb 3.2 oz)   06/15/17 46 kg (101 lb 6.4 oz)   05/30/17 44.5 kg (98 lb)                 Today's Medication Changes          These changes are accurate as of: 7/26/17 10:49 AM.  If you have any questions, ask your nurse or doctor.               Start taking these medicines.        Dose/Directions    azithromycin 250 MG tablet   Commonly known as:  ZITHROMAX   Used for:  Lung replaced by transplant (H), Lung transplant rejection (H)   Started by:  Glynn Jarquin MD        Take one tablet every Monday, Wednesday and Friday   Quantity:  12 tablet   Refills:  1         These medicines have changed or have updated prescriptions.        Dose/Directions    * predniSONE 2.5 MG tablet   Commonly known as:  DELTASONE   This may have changed:  Another medication with the same name was added. Make sure you understand how and when to take each.   Used for:  History of transplantation, lung (H)   Changed by:  Glynn Jarquin MD        Dose:  2.5 mg   Take 1 tablet (2.5 mg) by mouth every evening   Quantity:  30 tablet   Refills:  11       * predniSONE 5 MG tablet   Commonly known as:  DELTASONE   This may have changed:  Another medication with the same name was added. Make sure you understand how and when to take each.   Used for:  History of transplantation, lung (H)   Changed by:  Glynn Jaruqin MD        Dose:  5 mg   Take 1 tablet (5 mg) by mouth daily   Quantity:  30 tablet   Refills:  11       * predniSONE 10 MG tablet   Commonly known as:  DELTASONE   This may have changed:  You were already taking a medication with the same name, and this prescription was added. Make sure you understand " how and when to take each.   Used for:  Lung replaced by transplant (H), Lung transplant rejection (H)   Changed by:  Glynn Jarquin MD        Dose:  30 mg   Take 3 tablets (30 mg) by mouth daily For one week and then return to baseline dosing   Quantity:  21 tablet   Refills:  0       * Notice:  This list has 3 medication(s) that are the same as other medications prescribed for you. Read the directions carefully, and ask your doctor or other care provider to review them with you.      Stop taking these medicines if you haven't already. Please contact your care team if you have questions.     azaTHIOprine 50 MG tablet   Commonly known as:  IMURAN   Stopped by:  Glynn Jarquin MD                Where to get your medicines      These medications were sent to [id number] [facility] [address] [facility] [address] [address] [address] [place] [address]     Phone:  [phone]     azithromycin 250 MG tablet    predniSONE 10 MG tablet                Primary Care Provider Office Phone # Fax #    Maria Fernanda Armijo [phone]       [facility] [address]  [place] [address]        Equal Access to Services     MIGUEL SHAY AH: Hadii mary ku hadasho [place], waaxda luqadaha, qaybta kaalmada adeegyada, soy jones. So [facility] [phone].    ATENCIÓN: Si habla español, tiene a styles disposición servicios gratuitos de asistencia lingüística. [facility] [phone].    We comply with applicable federal civil rights laws and [place] laws. We do not discriminate on the basis of race, color, national origin, age, disability sex, sexual orientation or gender identity.            Thank you!     Thank you for choosing [facility]  for your care. Our goal is always to provide you with excellent care. Hearing back from our patients is one way we can continue to improve our services. Please  take a few minutes to complete the written survey that you may receive in the mail after your visit with us. Thank you!             Your Updated Medication List - Protect others around you: Learn how to safely use, store and throw away your medicines at www.disposemymeds.org.          This list is accurate as of: 7/26/17 10:49 AM.  Always use your most recent med list.                   Brand Name Dispense Instructions for use Diagnosis    acetaminophen 325 MG tablet    TYLENOL    100 tablet    Take 2 tablets (650 mg) by mouth every 4 hours as needed for mild pain    History of transplantation, lung (H)       amLODIPine 10 MG tablet    NORVASC    30 tablet    Take 1 tablet (10 mg) by mouth At Bedtime    Secondary hypertension       azithromycin 250 MG tablet    ZITHROMAX    12 tablet    Take one tablet every Monday, Wednesday and Friday    Lung replaced by transplant (H), Lung transplant rejection (H)       calcium carbonate 1250 MG tablet    OS-JUMANA 500 mg [place]. Ca    90 tablet    Take 1 tablet (1,250 mg) by mouth daily    Lung replaced by transplant (H), Essential hypertension       cholecalciferol 1000 UNIT tablet    vitamin D    30 tablet    Take 1 tablet (1,000 Units) by mouth daily    Lung replaced by transplant (H)       clonazePAM 0.5 MG tablet    klonoPIN    180 tablet    Take 1-2 tablets every 8 hours PRN as needed for anxiety    Generalized anxiety disorder       dronabinol 2.5 MG capsule    MARINOL    60 capsule    Take 2 capsules (5 mg) by mouth 2 times daily    Protein-calorie malnutrition (H)       Fish Oil 500 MG Caps      Take 1,200 mg by mouth        furosemide 20 MG tablet    LASIX     Take 20 mg by mouth        ipratropium 0.06 % spray    ATROVENT    30 mL    Spray 1 spray into both nostrils 4 times daily    History of transplantation, lung (H)       lactobacillus rhamnosus (GG) capsule     90 capsule    Take 1 capsule by mouth 3 times daily (before meals)    Functional diarrhea        levothyroxine 75 MCG tablet    SYNTHROID/LEVOTHROID    30 tablet    Take 1 tablet (75 mcg) by mouth daily    Hypothyroidism, unspecified type       metoprolol 25 MG tablet    LOPRESSOR     Take 25 mg by mouth        multivitamin, therapeutic with minerals Tabs tablet     30 each    Take 1 tablet by mouth daily    History of transplantation, lung (H)       pantoprazole 40 MG EC tablet    PROTONIX    30 tablet    Take 1 tablet (40 mg) by mouth daily    Hypothyroidism, unspecified type       * predniSONE 2.5 MG tablet    DELTASONE    30 tablet    Take 1 tablet (2.5 mg) by mouth every evening    History of transplantation, lung (H)       * predniSONE 5 MG tablet    DELTASONE    30 tablet    Take 1 tablet (5 mg) by mouth daily    History of transplantation, lung (H)       * predniSONE 10 MG tablet    DELTASONE    21 tablet    Take 3 tablets (30 mg) by mouth daily For one week and then return to baseline dosing    Lung replaced by transplant (H), Lung transplant rejection (H)       sulfamethoxazole-trimethoprim 400-80 MG per tablet    BACTRIM/SEPTRA    60 tablet    Take 1 tablet by mouth Every Mon, Wed, Fri Morning    Lung replaced by transplant (H)       tacrolimus 1 MG capsule    PROGRAF - GENERIC EQUIVALENT    90 capsule    Take 2 capsules (2mg) every morning and one capsule (1mg) every evening.    History of transplantation, lung (H)       valGANciclovir 450 MG tablet    VALCYTE    60 tablet    Take 1 tablet (450 mg) by mouth every other day    History of transplantation, lung (H)       * Notice:  This list has 3 medication(s) that are the same as other medications prescribed for you. Read the directions carefully, and ask your doctor or other care provider to review them with you.       [[id number]]

## 2022-04-20 NOTE — ASU DISCHARGE PLAN (ADULT/PEDIATRIC) - CARE PROVIDER_API CALL
Jim Murphy (MD)  Otolaryngology  [address] [place] [address], [facility] [address]  [place] [address]  Phone: [phone]  Fax: [phone]  Follow Up Time:

## 2022-04-20 NOTE — ASU PATIENT PROFILE, ADULT - FALL HARM RISK - UNIVERSAL INTERVENTIONS
Bed in lowest position, wheels locked, appropriate side rails in place/Call bell, personal items and telephone in reach/Instruct patient to call for assistance before getting out of bed or chair/Non-slip footwear when patient is out of bed/[place] to call system/Physically safe environment - no spills, clutter or unnecessary equipment/Purposeful Proactive Rounding/Room/bathroom lighting operational, light cord in reach

## 2022-04-20 NOTE — ASU DISCHARGE PLAN (ADULT/PEDIATRIC) - NS MD DC FALL RISK RISK
For information on Fall & Injury Prevention, visit: [email or web address] OR  [email or web address] OR  https://www.cdc.gov/steadi/patient.html

## 2022-05-05 ENCOUNTER — APPOINTMENT (OUTPATIENT)
Dept: OTOLARYNGOLOGY | Facility: CLINIC | Age: 57
End: 2022-05-05
Payer: COMMERCIAL

## 2022-05-05 VITALS — HEART RATE: 71 BPM | SYSTOLIC BLOOD PRESSURE: 117 MMHG | TEMPERATURE: 98 F | DIASTOLIC BLOOD PRESSURE: 75 MMHG

## 2022-05-05 PROCEDURE — 99024 POSTOP FOLLOW-UP VISIT: CPT

## 2022-05-05 NOTE — PHYSICAL EXAM
[de-identified] : + good flow from all 4 glands  [de-identified] : very poor tip support  [Midline] : trachea located in midline position [Normal] : no rashes

## 2022-05-05 NOTE — ASSESSMENT
[FreeTextEntry1] : Nasal congestion with mild septal deviation, perf and scarred lining with valve collapse and poor tip support. airway still open, lots of scarring and perf, feels last surgery made her worse, looks open outsid eof possibly frontal, multiple septal perfs superiorly will see if can avoid further procedure \par - q-nasal, has helped in the past. A topical steroid reduce mucosal swelling, illustrated appropriate use and how to reduce the risk of bleeding \par - Nasal irrigation and showed how to use it to maximize effectiveness \par - ponaris oil\par continue allergy shots \par \par \par pt with right SMG sialoadenitis likely can get via sialoendsocopy, looks to be in the proximal duct on scan, left parotid stone in the tail of the parotid, unlikely to be in the duct - will likely not be able to remove, and is likely not the cause of the issue\par it seems all her glands are swelling, but not textbook for obstruction, she would like to try to clean them out and dilate to see if will help. discussed risk of persist sx, and odd of her cure are lower. she understood \par S/p 4 gland washout 4/20/22 doing well post op, right parotid stent still in - will keep for another 2 weeks \par no dry eyes of mouth\par

## 2022-05-05 NOTE — HISTORY OF PRESENT ILLNESS
[de-identified] : pt s/p 4 gland wash out doing well post op \par feels overall swelling and pain in glands much improved, still some discomfort in right parotid - still has stent in and was the worse gland on washout \par no pain or swelling \par \par \par right sided SMG mostly gets swelling intermittently \par 3 years\par also left parotid - swells up less\par get swelling in the right parotid and left SMg as well \par uses hot pack - does help \par feels like throat is closing \par no dry eyes or dry mouth \par no history chemo or rt \par \par also with sinus issues with history of sinus surgery and a septal perf after \par gets nasal congestion worse in the right \par

## 2022-05-05 NOTE — CONSULT LETTER
[Please see my note below.] : Please see my note below. [FreeTextEntry1] : Dear Dr. IFTIKHAR HSU \par I had the pleasure of evaluating your patient AVERY KIM, thank you for allowing us to participate in their care. please see full note detailing our visit below.\par If you have any questions, please do not hesitate to call me and I would be happy to discuss further. \par \par Jim Murphy M.D.\par Attending Physician,  \par Department of Otolaryngology - Head and Neck Surgery\par [facility] \par Office: [phone]\par Fax: [phone]\par \par

## 2022-05-11 ENCOUNTER — APPOINTMENT (OUTPATIENT)
Dept: OTOLARYNGOLOGY | Facility: HOSPITAL | Age: 57
End: 2022-05-11

## 2022-05-24 ENCOUNTER — APPOINTMENT (OUTPATIENT)
Dept: OTOLARYNGOLOGY | Facility: CLINIC | Age: 57
End: 2022-05-24
Payer: COMMERCIAL

## 2022-05-24 VITALS
WEIGHT: 175 LBS | DIASTOLIC BLOOD PRESSURE: 79 MMHG | HEIGHT: 62 IN | TEMPERATURE: 98.1 F | HEART RATE: 71 BPM | SYSTOLIC BLOOD PRESSURE: 115 MMHG | BODY MASS INDEX: 32.2 KG/M2

## 2022-05-24 PROCEDURE — 99024 POSTOP FOLLOW-UP VISIT: CPT

## 2022-05-24 NOTE — CONSULT LETTER
[Please see my note below.] : Please see my note below. [FreeTextEntry1] : Dear Dr. IFTIKHAR HSU \par I had the pleasure of evaluating your patient AVERY KIM, thank you for allowing us to participate in their care. please see full note detailing our visit below.\par If you have any questions, please do not hesitate to call me and I would be happy to discuss further. \par \par Jim Murphy M.D.\par Attending Physician,  \par Department of Otolaryngology - Head and Neck Surgery\par [facility] \par Office: [phone]\par Fax: [phone]\par \par

## 2022-05-24 NOTE — REASON FOR VISIT
Pharmacy left a voicemail for clarification on the patients sig, they state it has take one tab daily take half tab with dinner, [Subsequent Evaluation] : a subsequent evaluation for [FreeTextEntry2] : f/u

## 2022-05-24 NOTE — HISTORY OF PRESENT ILLNESS
[de-identified] : 55 y/o F s/p 4 gland wash out doing well post op \par feels overall swelling and pain in glands much improved, still some discomfort in right SMG, and left parotid. Pt also with left sided discomfort underneath the tongue x couple of days . \par Pt denies no swelling with eating. \par no pain or swelling \par pt states still better than how it was before the procedure.

## 2022-05-24 NOTE — PHYSICAL EXAM
[Normal] : mucosa is normal [Midline] : trachea located in midline position [de-identified] : good flow b/l

## 2022-05-24 NOTE — ASSESSMENT
[FreeTextEntry1] : pt with right SMG sialoadenitis likely can get via sialoendsocopy, looks to be in the proximal duct on scan, left parotid stone in the tail of the parotid, unlikely to be in the duct - will likely not be able to remove, and is likely not the cause of the issue\par it seems all her glands are swelling, but not textbook for obstruction, she would like to try to clean them out and dilate to see if will help. discussed risk of persist sx, and odd of her cure are lower. she understood \par S/p 4 gland washout 4/20/22 doing well post op\par - Will proceed with regiment to increase salivary flow to reduce pooling in the gland and washout any possible obstruction if possible. Recommended hydration, regular massage, sour candies, and warm compress\par - will dilate next visit \par \par \par Nasal congestion with mild septal deviation, perf and scarred lining with valve collapse and poor tip support. airway still open, lots of scarring and perf, feels last surgery made her worse, looks open outsid eof possibly frontal, multiple septal perfs superiorly will see if can avoid further procedure \par - q-nasal, has helped in the past. A topical steroid reduce mucosal swelling, illustrated appropriate use and how to reduce the risk of bleeding \par - Nasal irrigation and showed how to use it to maximize effectiveness \par - ponaris oil\par continue allergy shots \par

## 2022-06-30 ENCOUNTER — APPOINTMENT (OUTPATIENT)
Dept: OTOLARYNGOLOGY | Facility: CLINIC | Age: 57
End: 2022-06-30

## 2022-06-30 VITALS
HEIGHT: 62 IN | HEART RATE: 67 BPM | TEMPERATURE: 97.4 F | BODY MASS INDEX: 32.2 KG/M2 | SYSTOLIC BLOOD PRESSURE: 102 MMHG | DIASTOLIC BLOOD PRESSURE: 74 MMHG | WEIGHT: 175 LBS

## 2022-06-30 DIAGNOSIS — J34.2 DEVIATED NASAL SEPTUM: ICD-10-CM

## 2022-06-30 PROCEDURE — 42660 DILATION OF SALIVARY DUCT: CPT | Mod: RT,58

## 2022-06-30 PROCEDURE — 99024 POSTOP FOLLOW-UP VISIT: CPT

## 2022-06-30 NOTE — HISTORY OF PRESENT ILLNESS
[de-identified] : 55 y/o F s/p 4 gland wash out doing well post op \par feels overall swelling and pain in glands much improved, still some discomfort in right SMG, and left parotid.  \par still with some swelling on the right SMG 70% better but not full resolution, also has a dental issue there\par other 3 glands doing well \par Pt denies no swelling with eating. \par no pain

## 2022-06-30 NOTE — ASSESSMENT
[FreeTextEntry1] : pt with right SMG sialoadenitis likely can get via sialoendsocopy, looks to be in the proximal duct on scan, left parotid stone in the tail of the parotid, unlikely to be in the duct - will likely not be able to remove, and is likely not the cause of the issue\par it seems all her glands are swelling, but not textbook for obstruction, she would like to try to clean them out and dilate to see if will help. discussed risk of persist sx, and odd of her cure are lower. she understood \par S/p 4 gland washout 4/20/22 doing well post op\par - Will proceed with regiment to increase salivary flow to reduce pooling in the gland and washout any possible obstruction if possible. Recommended hydration, regular massage, sour candies, and warm compress\par - will see if dilation right SMG helped \par \par \par Nasal congestion with mild septal deviation, perf and scarred lining with valve collapse and poor tip support. airway still open, lots of scarring and perf, feels last surgery made her worse, looks open outsid eof possibly frontal, multiple septal perfs superiorly will see if can avoid further procedure \par - q-nasal, has helped in the past. A topical steroid reduce mucosal swelling, illustrated appropriate use and how to reduce the risk of bleeding \par - Nasal irrigation and showed how to use it to maximize effectiveness \par - ponaris oil\par continue allergy shots \par

## 2022-06-30 NOTE — PROCEDURE
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 they elected to proceed\par procedure - using microscopy and massage the punctum was identified.Storz punctum dilators dilated punctum 1-5 followed by conical dilator. Lacrimal probes introduced then introduced to dilate the full duct and sequential dilated form 0000 to size 6. unclear if did feel area of narrowing dilated. pt tolerated well. disposed of\par

## 2022-06-30 NOTE — CONSULT LETTER
[Please see my note below.] : Please see my note below. [FreeTextEntry1] : Dear Dr. IFTIKHAR HSU \par I had the pleasure of evaluating your patient AVERY KIM, thank you for allowing us to participate in their care. please see full note detailing our visit below.\par If you have any questions, please do not hesitate to call me and I would be happy to discuss further. \par \par Jim Murphy M.D.\par Attending Physician,  \par Department of Otolaryngology - Head and Neck Surgery\par [facility] \par Office: [phone]\par Fax: [phone]\par \par

## 2022-07-14 ENCOUNTER — APPOINTMENT (OUTPATIENT)
Dept: OTOLARYNGOLOGY | Facility: CLINIC | Age: 57
End: 2022-07-14

## 2022-07-14 VITALS
BODY MASS INDEX: 32.2 KG/M2 | HEART RATE: 84 BPM | HEIGHT: 62 IN | WEIGHT: 175 LBS | DIASTOLIC BLOOD PRESSURE: 81 MMHG | TEMPERATURE: 98.2 F | SYSTOLIC BLOOD PRESSURE: 133 MMHG

## 2022-07-14 DIAGNOSIS — J32.9 CHRONIC SINUSITIS, UNSPECIFIED: ICD-10-CM

## 2022-07-14 DIAGNOSIS — K11.23 CHRONIC SIALOADENITIS: ICD-10-CM

## 2022-07-14 DIAGNOSIS — J34.89 OTHER SPECIFIED DISORDERS OF NOSE AND NASAL SINUSES: ICD-10-CM

## 2022-07-14 DIAGNOSIS — K11.5 SIALOLITHIASIS: ICD-10-CM

## 2022-07-14 PROCEDURE — 99024 POSTOP FOLLOW-UP VISIT: CPT

## 2022-07-14 NOTE — CONSULT LETTER
[Please see my note below.] : Please see my note below. [FreeTextEntry1] : Dear Dr. IFTIKHAR HSU \par I had the pleasure of evaluating your patient AVERY KIM, thank you for allowing us to participate in their care. please see full note detailing our visit below.\par If you have any questions, please do not hesitate to call me and I would be happy to discuss further. \par \par Jim Murphy M.D.\par Attending Physician,  \par Department of Otolaryngology - Head and Neck Surgery\par [facility] \par Office: [phone]\par Fax: [phone]\par \par

## 2022-07-14 NOTE — HISTORY OF PRESENT ILLNESS
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 \par feels dilation possibly helps a bit \par Reports lump on her right jaw with intermittent swelling, which has been there for several years.\par Unsure if related to salivary gland or if related to oral infection. \par Also complains of swelling and soreness under the left side of her tongue. \par Occasional fluid release from right ride with foul taste.\par No current pain or fever.

## 2022-07-14 NOTE — PHYSICAL EXAM
[Normal] : mucosa is normal [Midline] : trachea located in midline position [de-identified] : good flow b/l

## 2022-07-14 NOTE — HISTORY OF PRESENT ILLNESS
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 \par feels dilation possibly helps a bit \par Reports lump on her right jaw with intermittent swelling, which has been there for several years.\par Unsure if related to salivary gland or if related to oral infection. \par Also complains of swelling and soreness under the left side of her tongue. \par Occasional fluid release from right ride with foul taste.\par No current pain or fever.

## 2022-07-14 NOTE — PHYSICAL EXAM
[Normal] : mucosa is normal [Midline] : trachea located in midline position [de-identified] : good flow b/l

## 2022-07-14 NOTE — ASSESSMENT
[FreeTextEntry1] : pt with right SMG sialoadenitis likely can get via sialoendsocopy, looks to be in the proximal duct on scan, left parotid stone in the tail of the parotid, unlikely to be in the duct - will likely not be able to remove, and is likely not the cause of the issue\par it seems all her glands are swelling, but not textbook for obstruction, she would like to try to clean them out and dilate to see if will help. discussed risk of persist sx, and odd of her cure are lower. she understood \par S/p 4 gland washout 4/20/22 doing well post op, unclear if sx still salivary related, clear CT, discussed next step would be gland removal - sx do not warrant it per pt, and unclear if would resolve them. dilated last visit - main duct completely open \par - Will proceed with regiment to increase salivary flow to reduce pooling in the gland and washout any possible obstruction if possible. Recommended hydration, regular massage, sour candies, and warm compress\par \par \par \par Nasal congestion with mild septal deviation, perf and scarred lining with valve collapse and poor tip support. airway still open, lots of scarring and perf, feels last surgery made her worse, looks open outside of possibly frontal, multiple septal perfs superiorly will see if can avoid further procedure \par - q-nasal, has helped in the past. A topical steroid reduce mucosal swelling, illustrated appropriate use and how to reduce the risk of bleeding \par - Nasal irrigation and showed how to use it to maximize effectiveness \par - ponaris oil\par continue allergy shots \par

## 2022-07-14 NOTE — CONSULT LETTER
[Please see my note below.] : Please see my note below. [FreeTextEntry1] : Dear Dr. IFTIKHAR HSU \par I had the pleasure of evaluating your patient AVERY KIM, thank you for allowing us to participate in their care. please see full note detailing our visit below.\par If you have any questions, please do not hesitate to call me and I would be happy to discuss further. \par \par Jim Murphy M.D.\par Attending Physician,  \par Department of Otolaryngology - Head and Neck Surgery\par [facility] \par Office: [phone]\par Fax: [phone]\par \par

## 2022-07-17 ENCOUNTER — NON-APPOINTMENT (OUTPATIENT)
Age: 57
End: 2022-07-17

## 2022-07-21 ENCOUNTER — APPOINTMENT (OUTPATIENT)
Dept: GYNECOLOGIC ONCOLOGY | Facility: CLINIC | Age: 57
End: 2022-07-21

## 2022-07-21 DIAGNOSIS — Z78.9 OTHER SPECIFIED HEALTH STATUS: ICD-10-CM

## 2022-07-21 DIAGNOSIS — Z01.818 ENCOUNTER FOR OTHER PREPROCEDURAL EXAMINATION: ICD-10-CM

## 2022-07-21 DIAGNOSIS — Z80.0 FAMILY HISTORY OF MALIGNANT NEOPLASM OF DIGESTIVE ORGANS: ICD-10-CM

## 2022-07-21 DIAGNOSIS — Z80.3 FAMILY HISTORY OF MALIGNANT NEOPLASM OF BREAST: ICD-10-CM

## 2022-07-21 PROCEDURE — 76830 TRANSVAGINAL US NON-OB: CPT | Mod: 59

## 2022-07-21 PROCEDURE — 99204 OFFICE O/P NEW MOD 45 MIN: CPT | Mod: 25

## 2022-07-21 PROCEDURE — 76857 US EXAM PELVIC LIMITED: CPT | Mod: 59

## 2022-07-21 RX ORDER — BECLOMETHASONE DIPROPIONATE HFA 80 UG/1
80 AEROSOL, METERED RESPIRATORY (INHALATION)
Qty: 11 | Refills: 0 | Status: DISCONTINUED | COMMUNITY
Start: 2021-09-04 | End: 2022-07-21

## 2022-07-21 RX ORDER — ERGOCALCIFEROL 1.25 MG/1
1.25 MG CAPSULE, LIQUID FILLED ORAL
Qty: 12 | Refills: 0 | Status: DISCONTINUED | COMMUNITY
Start: 2022-05-11 | End: 2022-07-21

## 2022-07-21 RX ORDER — BECLOMETHASONE DIPROPIONATE 80 UG/1
80 AEROSOL, METERED NASAL DAILY
Qty: 1 | Refills: 2 | Status: DISCONTINUED | COMMUNITY
Start: 2020-12-14 | End: 2022-07-21

## 2022-07-21 RX ORDER — OXYCODONE AND ACETAMINOPHEN 5; 325 MG/1; MG/1
5-325 TABLET ORAL
Qty: 18 | Refills: 0 | Status: DISCONTINUED | COMMUNITY
Start: 2022-04-21 | End: 2022-07-21

## 2022-07-21 RX ORDER — OMEPRAZOLE 40 MG/1
40 CAPSULE, DELAYED RELEASE ORAL
Qty: 90 | Refills: 0 | Status: DISCONTINUED | COMMUNITY
Start: 2022-05-10 | End: 2022-07-21

## 2022-07-21 RX ORDER — FAMOTIDINE 40 MG/1
40 TABLET, FILM COATED ORAL
Qty: 90 | Refills: 0 | Status: DISCONTINUED | COMMUNITY
Start: 2022-03-24 | End: 2022-07-21

## 2022-07-21 RX ORDER — AZITHROMYCIN 250 MG/1
250 TABLET, FILM COATED ORAL
Qty: 6 | Refills: 0 | Status: DISCONTINUED | COMMUNITY
Start: 2021-09-04 | End: 2022-07-21

## 2022-07-21 RX ORDER — BROMPHENIRAMINE MALEATE, PSEUDOEPHEDRINE HYDROCHLORIDE, 2; 30; 10 MG/5ML; MG/5ML; MG/5ML
30-2-10 SYRUP ORAL
Qty: 150 | Refills: 0 | Status: DISCONTINUED | COMMUNITY
Start: 2021-09-04 | End: 2022-07-21

## 2022-07-21 RX ORDER — METHYLPREDNISOLONE 4 MG/1
4 TABLET ORAL
Qty: 1 | Refills: 0 | Status: DISCONTINUED | COMMUNITY
Start: 2022-04-21 | End: 2022-07-21

## 2022-07-21 RX ORDER — AMOXICILLIN 875 MG/1
875 TABLET, FILM COATED ORAL
Qty: 20 | Refills: 0 | Status: DISCONTINUED | COMMUNITY
Start: 2022-03-24 | End: 2022-07-21

## 2022-07-21 RX ORDER — METRONIDAZOLE 7.5 MG/G
0.75 GEL VAGINAL
Qty: 70 | Refills: 0 | Status: DISCONTINUED | COMMUNITY
Start: 2021-09-01 | End: 2022-07-21

## 2022-07-21 RX ORDER — AMOXICILLIN AND CLAVULANATE POTASSIUM 875; 125 MG/1; MG/1
875-125 TABLET, COATED ORAL
Qty: 20 | Refills: 0 | Status: DISCONTINUED | COMMUNITY
Start: 2022-04-21 | End: 2022-07-21

## 2022-07-21 RX ORDER — CEPHALEXIN 500 MG/1
500 CAPSULE ORAL
Qty: 30 | Refills: 0 | Status: DISCONTINUED | COMMUNITY
Start: 2021-10-15 | End: 2022-07-21

## 2022-07-21 NOTE — OB HISTORY
[Total Preg ___] : : [unfilled] [Full Term ___] : [unfilled] (full-term) [Living ___] : [unfilled] (living) [ ___] : [unfilled]  section delivery(s)

## 2022-08-01 ENCOUNTER — APPOINTMENT (OUTPATIENT)
Dept: UROGYNECOLOGY | Facility: CLINIC | Age: 57
End: 2022-08-01

## 2022-08-01 VITALS
HEIGHT: 62 IN | BODY MASS INDEX: 32.2 KG/M2 | SYSTOLIC BLOOD PRESSURE: 110 MMHG | DIASTOLIC BLOOD PRESSURE: 74 MMHG | WEIGHT: 175 LBS

## 2022-08-01 DIAGNOSIS — R39.15 URGENCY OF URINATION: ICD-10-CM

## 2022-08-01 PROCEDURE — 99203 OFFICE O/P NEW LOW 30 MIN: CPT

## 2022-08-01 NOTE — DISCUSSION/SUMMARY
[FreeTextEntry1] : The patient was counseled regarding the pathophysiology of the above conditions. A total of approximately 45 minutes was spent on this visit, greater than 50%of which was spent on counseling. She was also counseled regarding the risks, benefits, indications, and alternatives of further evaluations studies, as well as various management options. She was given verbal and written information/education on pelvic floor muscle exercises, avoidance of dietary bladder irritants, and other strategies to improve bladder control. She was counseled regarding treatment options for stress urinary incontinence including pelvic floor PT, pessary placement and surgical management with midurethral sling.. Midurethral sling placement as well as urethral bulking was discussed. Pharmacologic management of overactive bladder and urgency incontinence was discussed. After a detailed discussion, following management plan was outlined:\par 1. Recommended urodynamic testing to further evaluate postural incontinence. If testing suggests stress incontinence, will offer midurethral sling. \par 2. UA with micro and urine culture was ordered to exclude UTI.\par \par

## 2022-08-01 NOTE — PHYSICAL EXAM
[Chaperone Present] : A chaperone was present in the examining room during all aspects of the physical examination [FreeTextEntry1] : General: Not in acute distress, alert and oriented x3.\par Abdomen: Soft, nontender, and nondistended. No obvious hepatosplenomegaly. No obvious hernias. A well-healed midline vertical scar of previous laparotomy\par Pelvic Exam: Normal external female genitalia. Saddle sensory exam S2 to S4 is intact. Perineal reflexes not visualized. Urethra is well supported, without prolapse, exudates, or lesions. Cough stress test is negative. Post void residual was checked with I/O cath and was 60 cc of clear urine. Pale and mildly atrophic-appearing vaginal epithelium. No vaginal blood or discharge. Cervix without abnormal lesions. Bimanual exam reveals a small uterus in normal positioning. No adnexal masses or tenderness. Levator ani contraction is 0/5. All vaginal compartments are well supported. \par \par

## 2022-08-01 NOTE — HISTORY OF PRESENT ILLNESS
[FreeTextEntry1] : Patient is a 56 years old P4 ( X 4) who is referred by Dr. Dodd for evaluation and management of urinary incontinence and overactive bladder\par Patient reports leaking urine with change in position (from sitting to standing). "When I put pressure on bladder, it just comes out". She denies leaking with coughing/ sneezing/ laughing\par She was seeing Dr. Pinon once a year for her bladder issues before he retired. She was prescribed a medication which was working fine but she saw an ad for a newer medication and asked to be switched . She hasn't started the newer medication yet. She feels that the pressure from the ovarian cyst might be contributing. \par At time, she will feel strong urge to void\par Daytime frequency: 1.5 hrs\par Nocturia: 0\par Wears pads "just in case" , pads are mostly dry\par Intermittent sensation of incomplete bladder emptying\par Denies hx of frequent UTI\par Daily fluid intake: water, soda 2 cans per day\par Denies vaginal bulge symptoms. \par She has a 5.58 cm multiseptated right ovarian cyst and she is considering laparoscopic right ovarian cystectomy with DR. Dodd. \par \par \par

## 2022-08-01 NOTE — ASSESSMENT
[FreeTextEntry1] : Patient is a 56-year-old multipara with postural urinary incontinence, intermittent urinary urgency and right ovarian cyst. On examination, there is no evidence of urethral hypermobility with a negative empty bladder supine cough stress test, normal postvoid residual, and poor evator ani awareness/contraction. She has no history of recurrent UTI. She admits to consumption of few  bladder irritants. Other potential contributing factors include high BMI. Patient is awating right ovarian cystectomy and was referred to UROGYN to assess the need for a combined procedure

## 2022-08-02 LAB
APPEARANCE: CLEAR
BACTERIA: NEGATIVE
BILIRUBIN URINE: NEGATIVE
BLOOD URINE: NEGATIVE
COLOR: NORMAL
GLUCOSE QUALITATIVE U: NEGATIVE
HYALINE CASTS: 0 /LPF
KETONES URINE: NEGATIVE
LEUKOCYTE ESTERASE URINE: NEGATIVE
MICROSCOPIC-UA: NORMAL
NITRITE URINE: NEGATIVE
PH URINE: 6
PROTEIN URINE: NEGATIVE
RED BLOOD CELLS URINE: 1 /HPF
SPECIFIC GRAVITY URINE: 1.02
SQUAMOUS EPITHELIAL CELLS: 1 /HPF
UROBILINOGEN URINE: NORMAL
WHITE BLOOD CELLS URINE: 0 /HPF

## 2022-08-03 LAB — BACTERIA UR CULT: NORMAL

## 2022-08-04 NOTE — PHYSICAL EXAM
[Chaperone Present] : A chaperone was present in the examining room during all aspects of the physical examination [Normal] : Parametria: Normal [FreeTextEntry1] : Linsey Davila PA-C

## 2022-08-04 NOTE — HISTORY OF PRESENT ILLNESS
[FreeTextEntry1] : Dr. Cuenca - Neurosurgeon \par \par 57 y/o  x 4 female LMP 5 years ago being referred by Dr. Simms for evaluation of right ovarian cyst. Patient reports urinary incontinence since 2021 which prompted work up. She reports she had work up with Dr. Doe and they could not find abnormality and related it to ovarian cyst. She reports to abdominal bloating. Denies vaginal bleeding/discharge, abdominal pain/distension, pelvis discomfort, changes in normal bowel, nausea/vomiting, unintentional weight loss/gain, and all other associated signs and symptoms.  Patient is currently sexually active, admits to pain with intercourse. Patient reports she had previous consult with Dr. Castillo where surgical management was discussed. \par \par 21 US: Uterus measuring 6.11 x 3.87 x 2.96 cm. Right ovarian cyst 4.58 cm, multi septated. \par \par CT A/P 2021: 4x5.6 cm complex right adnexal lesion likely ovarian in etiology and may represent a serous or mucinous cystandenoma or cystadenocarcinoma. Colonic diverticulosis. 2 cm left renal cyst. \par \par 21 US: Uterus 6.72 x 4.01 x 2.54 cm, right ovarian cyst noted 6.32 cm in dimension, complex with septations. RI = 3. LTO not visualized. \par \par Health Screening:\par Last Pap: 2020: No cell changes. Patient denies history of abnormal PAPs. \par Last Mammogram: > 2 years ago; normal as per patient \par Last Colonoscopy: > 2 years ago; normal as per patient \par Last Dexa: Patient has never had

## 2022-08-04 NOTE — ASSESSMENT
[FreeTextEntry1] : 57 y/o with complex right ovarian cyst. I discussed with the patient recommendation of observation with routine US imaging with follow up US in 6 weeks if concerning findings or increasing in size would recommend surgical intervention at that time. I discussed other recommendation of surgical intervention, due to size and features of complexity noted I discussed cyst will unlikely resolve on its own. I discussed that while cyst does not have concerning findings such as increased vascularity and solid/nodular areas, a malignancy cannot be ruled out completely without surgical removal. Surgical removal which was recommended was Lap BS, right ovarian cystectomy (patient requests to keep ovaries in situ at this time). Patient adamant about not removing fallopian tubes. It was explained in detail the standard of care as she is no longer childbearing and that fallopian tubes do not have any medical benefit to her at this time and moving forward and are recommended removal as they are thought to be where ovarian cancer originates. When patient questioned if she understands this and why she is choosing to not proceed with fallopian tube removal, she reported that she is aware it is increasing her risk of developing a future malignancy and them stay in situ has no health benefits and is not recommended, she is still continuing to refuse without able to answer the question of why she is choosing this or an appropriate reason. I discussed with the patient also possibility that when cyst is removed, increased risk of rupturing it due to size as we are leaving ovary in situ and if a malignancy is present it is possible that can increase spread, patient aware and agreeable to risks. I discussed while I do not recommend leaving fallopian tubes in situ, patient expressed she is aware of increased risk of developing malignancy in the future, I will perform a laparoscopic right ovarian cystectomy as per her wishes. \par \par I discussed at length with the patient the nature, purpose, risks, benefits, and alternatives of laparoscopic right ovarian cystectomy.  The patient understands the risks to include (but not be limited to) bowel injury, bleeding (with the possible need for transfusion), bladder or ureteral injury, infections, deep venous thrombosis, and bolivar-operative death.  The patient also understands that her surgery may not be able to be performed laparoscopically and that she may need a laparotomy.  She also understands the limitations of laparoscopic surgery and the possibility of missing a surgical complication with need for subsequent re-exploration.  She agrees to proceed.  She asked numerous questions which were answered to her satisfaction.  She understands the need for a pre-operative bowel preparation and agrees to comply with our instructions.  \par \par \par

## 2022-08-04 NOTE — END OF VISIT
[FreeTextEntry3] : This note accurately reflects the work and decisions made by me.\par Written by Linsey Davila PA-C acting as a scribe for Dr. Sharee Dodd.

## 2022-08-04 NOTE — CHIEF COMPLAINT
[FreeTextEntry1] : [place] Office\par \par [facility] [phone] at [address] [place] [address]

## 2022-08-11 ENCOUNTER — APPOINTMENT (OUTPATIENT)
Dept: UROGYNECOLOGY | Facility: CLINIC | Age: 57
End: 2022-08-11

## 2022-08-11 PROCEDURE — 51729 CYSTOMETROGRAM W/VP&UP: CPT

## 2022-08-11 PROCEDURE — 51784 ANAL/URINARY MUSCLE STUDY: CPT

## 2022-08-11 PROCEDURE — 51797 INTRAABDOMINAL PRESSURE TEST: CPT

## 2022-08-11 PROCEDURE — 51741 ELECTRO-UROFLOWMETRY FIRST: CPT

## 2022-08-11 RX ORDER — PHENAZOPYRIDINE HYDROCHLORIDE 200 MG/1
200 TABLET ORAL
Qty: 6 | Refills: 0 | Status: ACTIVE | COMMUNITY
Start: 2022-08-11 | End: 1900-01-01

## 2022-08-16 ENCOUNTER — APPOINTMENT (OUTPATIENT)
Dept: UROGYNECOLOGY | Facility: CLINIC | Age: 57
End: 2022-08-16

## 2022-08-16 VITALS
HEIGHT: 62 IN | BODY MASS INDEX: 31.65 KG/M2 | WEIGHT: 172 LBS | SYSTOLIC BLOOD PRESSURE: 122 MMHG | DIASTOLIC BLOOD PRESSURE: 80 MMHG

## 2022-08-16 DIAGNOSIS — N39.492 POSTURAL (URINARY) INCONTINENCE: ICD-10-CM

## 2022-08-16 PROCEDURE — 99213 OFFICE O/P EST LOW 20 MIN: CPT

## 2022-08-16 NOTE — DISCUSSION/SUMMARY
[Reviewed Clinical Lab Test(s)] : Results of clinical tests were reviewed. [Visit Time ___ Minutes] : [unfilled] minutes [Face to Face Time___ Minutes] : with [unfilled] minutes in face to face consultation. [FreeTextEntry1] : Discussed above findings with the patient. I do not recommend a sling procedure at this time. She agrees. She feels that she only experienced an episode of bladder leakage when she was having abdominal bloating. Its not an ongoing concerns for her\par I recommended pelvic floor exercises for now\par UA/ culture was negative \par F/u as needed

## 2022-08-16 NOTE — ASSESSMENT
[FreeTextEntry1] : Patient with an episode f postural incontinence whose urodynamic testing is negative for evidence of RAMIREZ or DO. She has normal capacity and normal PVR

## 2022-08-16 NOTE — HISTORY OF PRESENT ILLNESS
[FreeTextEntry1] : Patient is a 56-year-old multipara with postural urinary incontinence, intermittent urinary urgency and right ovarian cyst who was initially seen on 8/1/22. On previous examination, there was no evidence of urethral hypermobility with a negative empty bladder supine cough stress test, normal postvoid residual, and poor levator ani awareness/contraction. She has no history of recurrent UTI. She admits to consumption of few bladder irritants. Other potential contributing factors include high BMI. Patient is awaiting right ovarian cystectomy and was referred to UROGYN to assess the need for a combined procedure. \par She had urodynamic testing on 8/11/22 which was negative for evidence of RAMIREZ at filled volumes of 200-600 cc.No detrusor overactivity or voiding dysfunction

## 2022-09-07 ENCOUNTER — NON-APPOINTMENT (OUTPATIENT)
Age: 57
End: 2022-09-07

## 2022-09-07 ENCOUNTER — APPOINTMENT (OUTPATIENT)
Dept: NEUROSURGERY | Facility: CLINIC | Age: 57
End: 2022-09-07

## 2022-09-15 ENCOUNTER — APPOINTMENT (OUTPATIENT)
Dept: GYNECOLOGIC ONCOLOGY | Facility: CLINIC | Age: 57
End: 2022-09-15

## 2022-09-15 DIAGNOSIS — N83.291 OTHER OVARIAN CYST, RIGHT SIDE: ICD-10-CM

## 2022-09-15 PROCEDURE — 76857 US EXAM PELVIC LIMITED: CPT | Mod: 59

## 2022-09-15 PROCEDURE — 99214 OFFICE O/P EST MOD 30 MIN: CPT | Mod: 25

## 2022-09-15 PROCEDURE — 76830 TRANSVAGINAL US NON-OB: CPT | Mod: 59

## 2022-09-15 NOTE — REASON FOR VISIT
[FreeTextEntry1] : [place] Location \par \par [facility] [place]. [phone]\par [address] [place] [address]

## 2022-09-15 NOTE — PHYSICAL EXAM
[Normal] : Mood and affect: Normal [Restricted in physically strenuous activity but ambulatory and able to carry out work of a light or sedentary nature] : Status 1- Restricted in physically strenuous activity but ambulatory and able to carry out work of a light or sedentary nature, e.g., light house work, office work

## 2022-09-15 NOTE — END OF VISIT
[FreeTextEntry3] : Follow up when she has made decision regarding surgery, I recommend proceeding urgently with surgical removal of right ovary, frozen section and possible staging.\par Follow up with hematology and oropharyngeal surgery for elevated WBC [FreeTextEntry2] : Mame Thomas MA was present the entire duration of the patient interaction and gynecological exam.\par

## 2022-09-15 NOTE — HISTORY OF PRESENT ILLNESS
[FreeTextEntry1] : This 57 y/o patient was referred in July 2022 w/ complex right ovarian cyst. I discussed recommendation of observation with routine US imaging with follow up US in 6 weeks if concerning findings or increasing in size would recommend surgical intervention at the time. Also discussed recommendation of surgical intervention, due to size and features of complexity noted i discussed cyst will unlikely resolve on its own. I discussed that while cyst does not have concerning findings such as increased vascularity and solid/nodular areas, a malignancy cannot be ruled out completely without surgical removal. Surgical removal which was recommended was Lap BS, right ovarian cystectomy (patient requests to keep ovaries in situ at this time). Patient adamant about not removing fallopian tubes. It was explained in detail the standard of care as she is no longer childbearing and that fallopian tubes do not have any medical benefit to her at this time and moving forward and are recommended removal as they are thought to be where ovarian cancer originates. \par \par Patient was consented for Laparoscopic right ovarian cystectomy however WBC count was elevated and she is not being cleared by her PCP, PCP advised patient she needs to see hematologist. \par \par Patient returns to the office today for a 6 week follow up ultrasound. 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 She has not seen the Hematologist yet and has not had follow up with her oropharingeal surgeon for clearance for her elevated WBC.

## 2022-09-15 NOTE — ASSESSMENT
[FreeTextEntry1] : Pt is a 57 yo with a complex right ovarian mass. Todays US with increased internal complexity and increase in size to 6.8 cm from 5.9 cm. In the setting of elevated  I recommend proceeding with right oophorectomy, bilateral salpingectomy and possible hysterectomy and staging. The patient is adamantly declining removal of the entire ovary. I discussed that given now increased concern and at least 50% risk of malignancy, I am not willing to perform a cystectomy. If the patient desires a cystectomy she will have to find another surgeon as it is against my better judgement. If there are cancer cells present in the ovary by performing a cytectomy and causing a rupture I could spread the cells and worsen her prognosis. the patient understands my reasoning and would like to think about her options. She does not feel comfortable with someone removing her ovary. We discussed that ovarian cancers are deadly and delaying treatment can also result in worse prognosis. The patient understands and will let us know her decision.\par \par Ultrasound performed in office today revealed.\par Uterus 6.5 x 3.5 x 2.6 cm, Endometrial thickness 2.2 mm\par Right ovary with complex cystic mass 6.8 cm with septations\par no free fluid.

## 2022-09-19 ENCOUNTER — APPOINTMENT (OUTPATIENT)
Dept: UROGYNECOLOGY | Facility: CLINIC | Age: 57
End: 2022-09-19

## 2022-10-03 ENCOUNTER — NON-APPOINTMENT (OUTPATIENT)
Age: 57
End: 2022-10-03

## 2023-02-15 ENCOUNTER — APPOINTMENT (OUTPATIENT)
Dept: NEUROSURGERY | Facility: CLINIC | Age: 58
End: 2023-02-15

## 2025-04-19 NOTE — PHYSICAL EXAM
Physical Therapy    Physical Therapy Treatment    Patient Name: Gladys Salas  MRN: [id number]  Department: [address]  Room: 2017/2017-A  Today's Date: 4/19/2025  Time Calculation  Start Time: 1407  Stop Time: 1430  Time Calculation (min): 23 min         Assessment/Plan   PT Assessment  PT Assessment Results: Decreased strength, Decreased endurance, Impaired balance, Decreased mobility, Impaired judgement (pt. limited by c/o dizziness today but increased gait to 40' with CGA of 2. pt. completed therex on EOB with SBA as noted in flowsheet, then transferred into alarmed chair with CGA of1, call light inreach. All needs met.)  End of Session Communication: Bedside nurse  End of Session Patient Position: Up in chair, Alarm on  PT Plan  Inpatient/Swing Bed or Outpatient: Inpatient  PT Plan  Treatment/Interventions: Bed mobility, Transfer training, Gait training, Strengthening, Endurance training (VESTIBULAR EX)  PT Plan: Ongoing PT  PT Frequency: 3 times per week  PT Discharge Recommendations: Moderate intensity level of continued care  Equipment Recommended upon Discharge:  (TBD)  PT Recommended Transfer Status: Assist x1, Assist x2 (FWW)  PT - OK to Discharge: Yes (WHENMEDICALLY CLEARED)      General Visit Information:      General  Co-Treatment: OT  Co-Treatment Reason: To maximizes functional mobility outcomes while focusing on disicpline specifc goals.  Prior to Session Communication: Bedside nurse  Patient Position Received: Bed, 3 rail up, Alarm on  General Comment: pt. just back to bed, pleasant and agreeable for co-tx.    Subjective   Precautions:  Precautions  Medical Precautions: Fall precautions     Date/Time Vitals Session Patient Position Pulse Resp SpO2 BP MAP (mmHg)    04/19/25 1515 --  --  81  16  95 %  113/80  91                 Objective   Pain:  Pain Assessment  Pain Assessment: 0-10  0-10 (Numeric) Pain Score: 0 - No pain  Cognition:  Cognition  Orientation Level: Oriented X4  Coordination:      Postural Control:     Extremity/Trunk Assessments:    Activity Tolerance:  Activity Tolerance  Endurance: Tolerates 10 - 20 min exercise with multiple rests  Treatments:  Therapeutic Exercise  Therapeutic Exercise Performed: Yes (AP's, LAQ's, Marching, HIp abduction/adduction all xs' 20 reps each seated on EOB SBA.)    Bed Mobility  Bed Mobility: Yes (Supine--> Sit Min A of 1)    Ambulation/Gait Training  Ambulation/Gait Training Performed: Yes (Gait training with FWW x's 40' +3' with CGA of 2 secondary to c/o dizziness about MCC through gait training. pt. returned to EOB, and reports decrease in dizziness.)  Transfers  Transfer: Yes (SIt<--> Stand CGA of 1  from EOB x's 2 reps with vc's for safe hand placement)    Outcome Measures:  [facility] Basic Mobility  Turning from your back to your side while in a flat bed without using bedrails: A little  Moving from lying on your back to sitting on the side of a flat bed without using bedrails: A little  Moving to and from bed to chair (including a wheelchair): A little  Standing up from a chair using your arms (e.g. wheelchair or bedside chair): A little  To walk in hospital room: A little  Climbing 3-5 steps with railing: A lot  Basic Mobility - Total Score: 17    Education Documentation  Mobility Training, taught by Shakira Mendoza PTA at 4/19/2025  3:17 PM.  Learner: Patient  Readiness: Acceptance  Method: Explanation, Demonstration  Response: Verbalizes Understanding, Demonstrated Understanding    Education Comments  No comments found.        OP EDUCATION:       Encounter Problems       Encounter Problems (Active)       Mobility       STG - Patient will ambulate (Not Progressing)       Start:  04/17/25    Expected End:  04/28/25       FWW MIN A X2, 50 FT         Goal 1 (Progressing)       Start:  04/17/25    Expected End:  05/08/25       20 REPS RROM INCREASING STRENGTH TO STABILIZE GAIT            PT Transfers       STG - Transfer from bed to chair (Progressing)        Start:  04/17/25    Expected End:  04/21/25       FWW MIN A X1         STG - Patient will transfer sit to and from stand (Progressing)       Start:  04/17/25    Expected End:  04/19/25       MIN  A X1 FWW USING PROPER TECHNIQUE            Pain - Adult               [de-identified] : good flow b/l, tender at right SMG  [Normal] : mucosa is normal [Midline] : trachea located in midline position

## (undated) DEVICE — ELCTR BOVIE TIP NEEDLE INSULATED 2.8" EDGE

## (undated) DEVICE — POSITIONER FOAM HEADREST (PINK)

## (undated) DEVICE — WARMING BLANKET LOWER ADULT

## (undated) DEVICE — SPONGE PEANUT AUTO COUNT

## (undated) DEVICE — VENODYNE/SCD SLEEVE CALF MEDIUM

## (undated) DEVICE — TONGUE DEPRESSOR

## (undated) DEVICE — TUBING TRUWAVE PRESSURE MALE/FEMALE 72"

## (undated) DEVICE — DRAPE SPLIT SHEET 77" X 108"

## (undated) DEVICE — Device

## (undated) DEVICE — SALIVARY ACCESS DILATOR

## (undated) DEVICE — BLADE SCALPEL SAFETYLOCK #11

## (undated) DEVICE — DRAPE INSTRUMENT POUCH 6.75" X 11"

## (undated) DEVICE — NDL HYPO REGULAR BEVEL 25G X 1.5" (BLUE)

## (undated) DEVICE — VAGINAL PACKING 2 X 6"

## (undated) DEVICE — DRAPE MAYO STAND 30"

## (undated) DEVICE — MEDICATION LABELS W MARKER

## (undated) DEVICE — SPECIMEN CONTAINER 100ML

## (undated) DEVICE — SYR LUER LOK 5CC

## (undated) DEVICE — SOL IRR POUR NS 0.9% 500ML

## (undated) DEVICE — SOL IRR POUR H2O 250ML

## (undated) DEVICE — DRAPE MAGNETIC INSTRUMENT MEDIUM

## (undated) DEVICE — DRAPE TOWEL BLUE 17" X 24"